# Patient Record
Sex: MALE | Race: BLACK OR AFRICAN AMERICAN | NOT HISPANIC OR LATINO | Employment: UNEMPLOYED | URBAN - METROPOLITAN AREA
[De-identification: names, ages, dates, MRNs, and addresses within clinical notes are randomized per-mention and may not be internally consistent; named-entity substitution may affect disease eponyms.]

---

## 2024-02-19 ENCOUNTER — APPOINTMENT (OUTPATIENT)
Dept: RADIOLOGY | Facility: HOSPITAL | Age: 74
End: 2024-02-19
Payer: COMMERCIAL

## 2024-02-19 ENCOUNTER — HOSPITAL ENCOUNTER (OUTPATIENT)
Facility: HOSPITAL | Age: 74
Setting detail: OBSERVATION
Discharge: HOME | End: 2024-02-19
Attending: EMERGENCY MEDICINE | Admitting: PHYSICIAN ASSISTANT
Payer: COMMERCIAL

## 2024-02-19 ENCOUNTER — CLINICAL SUPPORT (OUTPATIENT)
Dept: EMERGENCY MEDICINE | Facility: HOSPITAL | Age: 74
End: 2024-02-19
Payer: COMMERCIAL

## 2024-02-19 VITALS
DIASTOLIC BLOOD PRESSURE: 54 MMHG | WEIGHT: 268 LBS | TEMPERATURE: 98.1 F | RESPIRATION RATE: 20 BRPM | SYSTOLIC BLOOD PRESSURE: 132 MMHG | BODY MASS INDEX: 39.69 KG/M2 | HEIGHT: 69 IN | OXYGEN SATURATION: 98 % | HEART RATE: 61 BPM

## 2024-02-19 DIAGNOSIS — J44.1 COPD EXACERBATION (MULTI): Primary | ICD-10-CM

## 2024-02-19 PROBLEM — J44.9 COPD (CHRONIC OBSTRUCTIVE PULMONARY DISEASE) (MULTI): Status: ACTIVE | Noted: 2024-02-19

## 2024-02-19 LAB
ALBUMIN SERPL BCP-MCNC: 4.1 G/DL (ref 3.4–5)
ALP SERPL-CCNC: 114 U/L (ref 33–136)
ALT SERPL W P-5'-P-CCNC: 23 U/L (ref 10–52)
ANION GAP BLDV CALCULATED.4IONS-SCNC: 9 MMOL/L (ref 10–25)
ANION GAP SERPL CALC-SCNC: 13 MMOL/L (ref 10–20)
AST SERPL W P-5'-P-CCNC: 26 U/L (ref 9–39)
BASE EXCESS BLDV CALC-SCNC: 7 MMOL/L (ref -2–3)
BASOPHILS # BLD AUTO: 0.04 X10*3/UL (ref 0–0.1)
BASOPHILS NFR BLD AUTO: 0.5 %
BILIRUB SERPL-MCNC: 0.3 MG/DL (ref 0–1.2)
BNP SERPL-MCNC: 192 PG/ML (ref 0–99)
BODY TEMPERATURE: 37 DEGREES CELSIUS
BUN SERPL-MCNC: 42 MG/DL (ref 6–23)
CA-I BLDV-SCNC: 1.18 MMOL/L (ref 1.1–1.33)
CALCIUM SERPL-MCNC: 9.5 MG/DL (ref 8.6–10.6)
CARDIAC TROPONIN I PNL SERPL HS: 13 NG/L (ref 0–53)
CARDIAC TROPONIN I PNL SERPL HS: 9 NG/L (ref 0–53)
CHLORIDE BLDV-SCNC: 102 MMOL/L (ref 98–107)
CHLORIDE SERPL-SCNC: 101 MMOL/L (ref 98–107)
CO2 SERPL-SCNC: 32 MMOL/L (ref 21–32)
CREAT SERPL-MCNC: 2.38 MG/DL (ref 0.5–1.3)
EGFRCR SERPLBLD CKD-EPI 2021: 28 ML/MIN/1.73M*2
EOSINOPHIL # BLD AUTO: 0.12 X10*3/UL (ref 0–0.4)
EOSINOPHIL NFR BLD AUTO: 1.6 %
ERYTHROCYTE [DISTWIDTH] IN BLOOD BY AUTOMATED COUNT: 18.4 % (ref 11.5–14.5)
FLUAV RNA RESP QL NAA+PROBE: NOT DETECTED
FLUBV RNA RESP QL NAA+PROBE: NOT DETECTED
GLUCOSE BLDV-MCNC: 161 MG/DL (ref 74–99)
GLUCOSE SERPL-MCNC: 158 MG/DL (ref 74–99)
HCO3 BLDV-SCNC: 34.7 MMOL/L (ref 22–26)
HCT VFR BLD AUTO: 31.1 % (ref 41–52)
HCT VFR BLD EST: 28 % (ref 41–52)
HGB BLD-MCNC: 9.5 G/DL (ref 13.5–17.5)
HGB BLDV-MCNC: 9.2 G/DL (ref 13.5–17.5)
IMM GRANULOCYTES # BLD AUTO: 0.03 X10*3/UL (ref 0–0.5)
IMM GRANULOCYTES NFR BLD AUTO: 0.4 % (ref 0–0.9)
LACTATE BLDV-SCNC: 1.5 MMOL/L (ref 0.4–2)
LYMPHOCYTES # BLD AUTO: 1.81 X10*3/UL (ref 0.8–3)
LYMPHOCYTES NFR BLD AUTO: 24.4 %
MAGNESIUM SERPL-MCNC: 2.81 MG/DL (ref 1.6–2.4)
MCH RBC QN AUTO: 25.7 PG (ref 26–34)
MCHC RBC AUTO-ENTMCNC: 30.5 G/DL (ref 32–36)
MCV RBC AUTO: 84 FL (ref 80–100)
MONOCYTES # BLD AUTO: 0.64 X10*3/UL (ref 0.05–0.8)
MONOCYTES NFR BLD AUTO: 8.6 %
NEUTROPHILS # BLD AUTO: 4.78 X10*3/UL (ref 1.6–5.5)
NEUTROPHILS NFR BLD AUTO: 64.5 %
NRBC BLD-RTO: 0 /100 WBCS (ref 0–0)
OXYHGB MFR BLDV: 46.9 % (ref 45–75)
PCO2 BLDV: 69 MM HG (ref 41–51)
PH BLDV: 7.31 PH (ref 7.33–7.43)
PLATELET # BLD AUTO: 228 X10*3/UL (ref 150–450)
PO2 BLDV: 37 MM HG (ref 35–45)
POTASSIUM BLDV-SCNC: 4.4 MMOL/L (ref 3.5–5.3)
POTASSIUM SERPL-SCNC: 4.4 MMOL/L (ref 3.5–5.3)
PROT SERPL-MCNC: 7.5 G/DL (ref 6.4–8.2)
RBC # BLD AUTO: 3.7 X10*6/UL (ref 4.5–5.9)
SAO2 % BLDV: 48 % (ref 45–75)
SARS-COV-2 RNA RESP QL NAA+PROBE: NOT DETECTED
SODIUM BLDV-SCNC: 141 MMOL/L (ref 136–145)
SODIUM SERPL-SCNC: 142 MMOL/L (ref 136–145)
WBC # BLD AUTO: 7.4 X10*3/UL (ref 4.4–11.3)

## 2024-02-19 PROCEDURE — 80053 COMPREHEN METABOLIC PANEL: CPT

## 2024-02-19 PROCEDURE — 93005 ELECTROCARDIOGRAM TRACING: CPT

## 2024-02-19 PROCEDURE — 84132 ASSAY OF SERUM POTASSIUM: CPT

## 2024-02-19 PROCEDURE — 71046 X-RAY EXAM CHEST 2 VIEWS: CPT

## 2024-02-19 PROCEDURE — 99285 EMERGENCY DEPT VISIT HI MDM: CPT | Mod: 25

## 2024-02-19 PROCEDURE — 87636 SARSCOV2 & INF A&B AMP PRB: CPT

## 2024-02-19 PROCEDURE — 94640 AIRWAY INHALATION TREATMENT: CPT

## 2024-02-19 PROCEDURE — 84484 ASSAY OF TROPONIN QUANT: CPT

## 2024-02-19 PROCEDURE — G0378 HOSPITAL OBSERVATION PER HR: HCPCS

## 2024-02-19 PROCEDURE — 71046 X-RAY EXAM CHEST 2 VIEWS: CPT | Performed by: RADIOLOGY

## 2024-02-19 PROCEDURE — 96365 THER/PROPH/DIAG IV INF INIT: CPT | Performed by: PHYSICIAN ASSISTANT

## 2024-02-19 PROCEDURE — 83880 ASSAY OF NATRIURETIC PEPTIDE: CPT

## 2024-02-19 PROCEDURE — 83735 ASSAY OF MAGNESIUM: CPT

## 2024-02-19 PROCEDURE — 2500000002 HC RX 250 W HCPCS SELF ADMINISTERED DRUGS (ALT 637 FOR MEDICARE OP, ALT 636 FOR OP/ED): Mod: SE | Performed by: PHYSICIAN ASSISTANT

## 2024-02-19 PROCEDURE — 85025 COMPLETE CBC W/AUTO DIFF WBC: CPT

## 2024-02-19 PROCEDURE — 2500000002 HC RX 250 W HCPCS SELF ADMINISTERED DRUGS (ALT 637 FOR MEDICARE OP, ALT 636 FOR OP/ED): Mod: SE

## 2024-02-19 PROCEDURE — 99236 HOSP IP/OBS SAME DATE HI 85: CPT | Performed by: PHYSICIAN ASSISTANT

## 2024-02-19 PROCEDURE — 2500000004 HC RX 250 GENERAL PHARMACY W/ HCPCS (ALT 636 FOR OP/ED): Mod: SE

## 2024-02-19 PROCEDURE — 93010 ELECTROCARDIOGRAM REPORT: CPT | Performed by: EMERGENCY MEDICINE

## 2024-02-19 PROCEDURE — 36415 COLL VENOUS BLD VENIPUNCTURE: CPT

## 2024-02-19 RX ORDER — PREDNISONE 20 MG/1
40 TABLET ORAL ONCE
Status: COMPLETED | OUTPATIENT
Start: 2024-02-19 | End: 2024-02-19

## 2024-02-19 RX ORDER — IPRATROPIUM BROMIDE AND ALBUTEROL SULFATE 2.5; .5 MG/3ML; MG/3ML
3 SOLUTION RESPIRATORY (INHALATION) EVERY 20 MIN
Status: COMPLETED | OUTPATIENT
Start: 2024-02-19 | End: 2024-02-19

## 2024-02-19 RX ORDER — ALBUTEROL SULFATE 0.83 MG/ML
2.5 SOLUTION RESPIRATORY (INHALATION) EVERY 2 HOUR PRN
Status: DISCONTINUED | OUTPATIENT
Start: 2024-02-19 | End: 2024-02-19 | Stop reason: HOSPADM

## 2024-02-19 RX ORDER — AZITHROMYCIN 250 MG/1
500 TABLET, FILM COATED ORAL DAILY
Qty: 8 TABLET | Refills: 0 | Status: SHIPPED | OUTPATIENT
Start: 2024-02-19 | End: 2024-02-23

## 2024-02-19 RX ORDER — PREDNISONE 20 MG/1
40 TABLET ORAL DAILY
Qty: 6 TABLET | Refills: 0 | Status: SHIPPED | OUTPATIENT
Start: 2024-02-19 | End: 2024-02-22

## 2024-02-19 RX ORDER — ALBUTEROL SULFATE 90 UG/1
2 AEROSOL, METERED RESPIRATORY (INHALATION) EVERY 6 HOURS PRN
Qty: 18 G | Refills: 0 | Status: SHIPPED | OUTPATIENT
Start: 2024-02-19 | End: 2025-02-18

## 2024-02-19 RX ORDER — BUDESONIDE 0.5 MG/2ML
0.5 INHALANT ORAL
Status: DISCONTINUED | OUTPATIENT
Start: 2024-02-19 | End: 2024-02-19 | Stop reason: HOSPADM

## 2024-02-19 RX ORDER — IPRATROPIUM BROMIDE AND ALBUTEROL SULFATE 2.5; .5 MG/3ML; MG/3ML
3 SOLUTION RESPIRATORY (INHALATION)
Status: DISCONTINUED | OUTPATIENT
Start: 2024-02-19 | End: 2024-02-19 | Stop reason: HOSPADM

## 2024-02-19 RX ADMIN — PREDNISONE 40 MG: 20 TABLET ORAL at 09:49

## 2024-02-19 RX ADMIN — AZITHROMYCIN 500 MG: 500 INJECTION, POWDER, LYOPHILIZED, FOR SOLUTION INTRAVENOUS at 12:14

## 2024-02-19 RX ADMIN — IPRATROPIUM BROMIDE AND ALBUTEROL SULFATE 3 ML: .5; 3 SOLUTION RESPIRATORY (INHALATION) at 09:49

## 2024-02-19 RX ADMIN — IPRATROPIUM BROMIDE AND ALBUTEROL SULFATE 3 ML: .5; 3 SOLUTION RESPIRATORY (INHALATION) at 09:50

## 2024-02-19 RX ADMIN — ALBUTEROL SULFATE 2.5 MG: 2.5 SOLUTION RESPIRATORY (INHALATION) at 15:54

## 2024-02-19 RX ADMIN — BUDESONIDE 0.5 MG: 0.5 INHALANT RESPIRATORY (INHALATION) at 19:26

## 2024-02-19 RX ADMIN — IPRATROPIUM BROMIDE AND ALBUTEROL SULFATE 3 ML: .5; 3 SOLUTION RESPIRATORY (INHALATION) at 15:47

## 2024-02-19 RX ADMIN — IPRATROPIUM BROMIDE AND ALBUTEROL SULFATE 3 ML: .5; 3 SOLUTION RESPIRATORY (INHALATION) at 19:25

## 2024-02-19 RX ADMIN — IPRATROPIUM BROMIDE AND ALBUTEROL SULFATE 3 ML: .5; 3 SOLUTION RESPIRATORY (INHALATION) at 09:48

## 2024-02-19 ASSESSMENT — COLUMBIA-SUICIDE SEVERITY RATING SCALE - C-SSRS
6. HAVE YOU EVER DONE ANYTHING, STARTED TO DO ANYTHING, OR PREPARED TO DO ANYTHING TO END YOUR LIFE?: NO
2. HAVE YOU ACTUALLY HAD ANY THOUGHTS OF KILLING YOURSELF?: NO
1. IN THE PAST MONTH, HAVE YOU WISHED YOU WERE DEAD OR WISHED YOU COULD GO TO SLEEP AND NOT WAKE UP?: NO

## 2024-02-19 ASSESSMENT — PAIN SCALES - GENERAL
PAINLEVEL_OUTOF10: 0 - NO PAIN
PAINLEVEL_OUTOF10: 0 - NO PAIN

## 2024-02-19 ASSESSMENT — PAIN - FUNCTIONAL ASSESSMENT
PAIN_FUNCTIONAL_ASSESSMENT: 0-10
PAIN_FUNCTIONAL_ASSESSMENT: 0-10

## 2024-02-19 NOTE — ED PROVIDER NOTES
CC: Shortness of Breath     HPI:  Patient is a 73-year-old male with a past medical history of COPD on 3L NC, HTN, HFpEF (EF 55% 11/23), A-fib/flutter on AC, infective endocarditis s/p AVR and MVR, and CKD 3B who presents to the ED for shortness of breath.  Patient noted shortness of breath that began approximately 2 hours before ED presentation.  He noted taking his home albuterol without any any improvement of the symptoms.  Denied any trigger as well as improving or worsening factors for his shortness of breath.  He notes that his shortness of breath is typical for his COPD exacerbations.  Patient presents to the ED on 5 L nasal cannula.  Patient is on 3 L nasal cannula at baseline.  Denied fevers, chills, nausea, vomiting, chest pain, abdominal pain, difficulty breathing, orthopnea, paroxysmal nocturnal dyspnea, headache, trauma, falls, dysuria, abnormal bowel moods.    Limitations to history: None  Independent historian(s): None  Records Reviewed: Recent available ED and inpatient notes reviewed in EMR.    PMHx/PSHx:  Per HPI.   - has no past medical history on file.  - has a past surgical history that includes Cardiac surgery (04/09/2018).    Medications:  Reviewed in EMR. See EMR for complete list of medications and doses.    Allergies:  Patient has no allergy information on record.    Social History:  - Tobacco:  has no history on file for tobacco use.   - Alcohol:  has no history on file for alcohol use.   - Illicit Drugs:  has no history on file for drug use.     ROS:  Per HPI.       ???????????????????????????????????????????????????????????????  Triage Vitals:  T 36.8 °C (98.2 °F)  HR 73  /60  RR (!) 24  O2 98 %      Physical Exam    General: Mild respiratory distress.  Head: Normocephalic. Atraumatic.    Neck: FROM. No gross masses.   Eyes: EOMI. No scleral icterus or injection.   ENT: Moist mucous membranes, no apparent trauma or lesions.  CV: Regular rhythm. No murmurs, rubs, gallops  appreciated. 2+ radial pulses bilaterally.  Resp: Diminished breath sound bilaterally.  Tachypnea.  GI: Soft, non-distended. No tenderness with palpation. No rebound tenderness or guarding. No palpable masses.  : No suprapubic or CVA tenderness.  MSK: No gross step offs or deformities.  EXT: No peripheral edema, contusions, or wounds.  Skin: Warm and dry, no rashes or lesions.  Neuro: No focal neurological deficits from stated baseline. Speech fluent.  Psych: Appropriate mood and behavior, converses and responds appropriately.    ???????????????????????????????????????????????????????????????  Labs:   Labs Reviewed   BLOOD GAS VENOUS FULL PANEL   TROPONIN SERIES- (INITIAL, 1 HR)    Narrative:     The following orders were created for panel order Troponin I Series, High Sensitivity (0, 1 HR).  Procedure                               Abnormality         Status                     ---------                               -----------         ------                     Troponin I, High Sensiti...[[id number]]                                                   Please view results for these tests on the individual orders.   CBC WITH AUTO DIFFERENTIAL   COMPREHENSIVE METABOLIC PANEL   MAGNESIUM   B-TYPE NATRIURETIC PEPTIDE   SARS-COV-2 AND INFLUENZA A/B PCR   SERIAL TROPONIN-INITIAL        Imaging:   No orders to display        EKG:  Rate is 110, afib, normal axis, no interval prolongation, no st elevation or depression.  No previous EKG.  Review of EKG does not show any signs of STEMI, complete heart block, asystole, V-fib.    MDM:  Patient is a 73-year-old male with a past medical history of COPD on 3L NC, HTN, HFpEF (EF 55% 11/23), A-fib/flutter on AC, infective endocarditis s/p AVR and MVR, and CKD 3B who presents to the ED for shortness of breath. Patient presented tachypneic with a respiratory rate of 24.  Patient satting mid 90s on 5 L nasal cannula.  Physical exam findings significant for diminished breath sounds  bilaterally and mild dyspnea.  Low clinical concern for PE or pneumothorax.  Low clinical concern for ACS unremarkable EKG and troponins.  He has full panel significant for hypercapnia with pCO2 of 69.  Previous pCO2 of 50.3 at [facility] on 6/15/2023.   Patient ordered DuoNebs and prednisone for COPD exacerbation.  CBC displayed leukocytosis and stable anemia of 9.5 with previous hemoglobin 9.7 on 2/1/2024 at OSH.  Electrolytes are within normal limits.  Creatinine noted to be elevated at 2.38 with previous creatinine of 2.46 on 2/1/2024 at OSH.  Low clinical concern for new onset heart failure with unremarkable BNP of 192 and no findings concerning for fluid overload.  CXR did not show any acute cardiopulmonary process including findings Durning for pneumonia.  Upon reevaluation, patient notes mild improvement of his work of breathing.  Patient's oxygen was down titrated to 2 L nasal cannula.  Discussed ED findings and CDU admission with the patient.  Patient stated understanding agreement with the plan.  All questions were answered.  Discussed patient presentation with CDU provider.  Patient mid to the CDU in stable condition.    ED Course:  Diagnoses as of 02/20/24 0950   COPD exacerbation (CMS/[facility])       Social Determinants Limiting Care:      Disposition:  Admit to CDU    Jerry Bergeron MD   Emergency Medicine PGY-2  [facility]    Comment: Please note this report has been produced using speech recognition software and may contain errors related to that system including errors in grammar, punctuation, and spelling as well as words and phrases that may be inappropriate.  If there are any questions or concerns please feel free to contact the dictating provider for clarification.    Procedures ? NEMOPTIC last updated 2/19/2024 8:51 AM     Attending Note:  The patient was seen by the resident/fellow/IVAN.  I have personally performed a substantive portion of the encounter.  I  have seen and examined the patient; agree with the workup, evaluation, MDM, management and diagnosis with the exception/addition of the following.  The care plan has been discussed with the resident/fellow; I have reviewed the resident/fellow’s note and agree with the documented findings with the exception/addition of the following:       HPI:   Dimitri Shields is a 73 year old male with history of bipolar disorder, HTN, HLD, IDDM (HbA1c ), AF (s/p multiple DCCV, on Eliquis), endocarditis (s/p MVR [bioprosthetic], AVR [homograft]), HFrecEF (LVEF 59% 8/2023), restrictive lung disease (FVC 68, FEV1 68, FEV1/FVC 98%, DLCO 55 -1/23), chronic respiratory failure (2-3 L O2 baseline), CKD 3B, chronic hepatitis C, CAD, and polysubstance abuse (heroin, alcohol, cocaine, marijuana, currently on Suboxone) presenting to the ED for concern for shortness of breath. The patient reports that this AM around 0500, he woke with shortness of breath and slight wheezing.  He denies any chest pain, palpitations, or hemoptysis. He denies prior history of DVT/PE. He denies calf swelling/calf pain.  No cough, rhinorrhea, fevers, chills or night sweats.  He reports he used his inhaler a few times without resolution.  He reports baseline 2-3L but required 5L this AM en-route to ED with EMS.     Per chart review, had nuclear stress 2/2023.   CONCLUSIONS:    1. SPECT Perfusion Study: Abnormal.very poor study due to body habitus   and motion.    2. There is no scintigraphic evidence for inducible ischemia.    3. There is a small (<10%) fixed perfusion defect in the RCA territory.    4. Left ventricle is normal in size. The left ventricle systolic   function is normal.    5. Right ventricle is mildly dilated. The right ventricle systolic   function is mildly decreased.    6. This is a low risk scan.     Additional History Obtained from: See HPI       Physical Exam:  General: Grossly well-developed, awake, alert, NAD    Head: Normocephalic, no overt  external signs of trauma    ENT: Mucus membranes moist, nares patent, uvula midline, no exudates, tonsillar pillars grossly symmetric    Neck: Supple, trachea midline    Neurologic: Awake and alert, FS, TM, PATIÑO x 4 with grossly symmetric strength, SILT grossly intact BUE and BLE    Cardiovascular: Appears regular, pulses grossly symmetric    Respiratory: End expiratory wheeze B/L, no obvious rales/rhonchi    Abdomen: Soft, not appreciably tender, no evident rebound/guarding, no pulsatile masses palpable.     Back: No apparent CVA TTP, no midline TLS spine TTP, stepoffs, or acute deformities    MSK: No gross bony deformities in BUE and BLE, no significant edema, no calf pain or palpable cords    Skin/Integumentary: Warm and dry    Psychiatric: Calm and cooperative.        EKG Interpretation:  EKG 2/19/23 - 0917 - limited 2/2 background artifact.  Appears regular, rate 110, axis normal, intervals QRS 94, Qtc 316, significant background artifact limiting interpretation, however, no obvious significant ST elevation or depression. No prior EKG for comparison in EMR at this time.     Interpreted by provider as above    Differential Diagnoses Considered:  See MDM below    Chronic Medical Conditions Significantly Affecting Care:  See MDM below    External Records Reviewed:  I reviewed recent and relevant outside records as noted in HPI above and MDM below.     Independent Interpretation of Studies:    Laboratory/Imaging Studies:  Laboratory results personally reviewed and independently interpreted:  CBC without significant thrombocytopenia or leukocytosis. Hb 9.5, stable from prior (9.7 2/1/24 at OSH)    Metabolic panel with creatinine 2.38 (recent prior 2.46 2/1/24 at OSH)  No hypomagnesemia  HS troponin not elevated.  Delta troponin stable    7.31/69/37, lactate 1.5  COVID-19/influenza negative    Radiology imaging and preliminary and/or final reports personally reviewed/independently  interpreted:  CXR  IMPRESSION:  Limited study due to hypoinflated lungs and patient positioning.      Diffuse prominence of the interstitial lung markings in the mid and  lower lung zones which may represent pulmonary edema or atypical  pneumonia. Correlate clinically.    Social Determinants of Health Significantly Affecting Care:  See HPI/MDM    Prescription Drug Consideration:  See MDM    Diagnostic testing considered:  See MDM and relevant sections      Medical Decision Making/Course:  73 year old male with history of bipolar disorder, HTN, HLD, IDDM (HbA1c ), AF (s/p multiple DCCV, on Eliquis), endocarditis (s/p MVR [bioprosthetic], AVR [homograft]), HFrecEF (LVEF 59% 8/2023), restrictive lung disease (FVC 68, FEV1 68, FEV1/FVC 98%, DLCO 55 -1/23), chronic respiratory failure (2-3 L O2 baseline), CKD 3B, chronic hepatitis C, CAD, and polysubstance abuse (heroin, alcohol, cocaine, marijuana, currently on Suboxone) presenting to the ED for concern for shortness of breath. The patient was afebrile, non-toxic and HDS on arrival, saturating well on NC. He had no increased WOB. He had an end expiratory wheeze and was given steroids and albuterol with improvement of wheezing, and was weaned to home supplemental oxygen. CXR with questionable atypical PNA vs pulmonary edema, BNP intermediate, however, patient without significant LE edema and denies orthopnea, PND, or other history consistent with CHF exacerbation.  He denies any chest pain. There was no calf swelling/pain, recent long travel/immobilization, malignancy, or major risk factors or history concerning for DVT/PE.  There were no obvious EKG changes concerning for pericarditis.  History and exam less consistent with myocarditis.  EKG and troponin, as well as history less consistent with ACS.  CXR without signs of pneumothorax, large effusion, or other acute thoracic process.  Pulses were grossly symmetric and there was no tearing chest pain to the back, or  history/exam findings consistent with aortic dissection  No pusatile abdominal mass or signs of ruptured AAA.  Patient was in agreement with admission to CDU for further breathing treatments, antibiotics, consideration of diuresis if later found to be indicated, and further optimization. An opportunity to ask questions was provided and all were addressed at that time.  The patient verbalized understanding and was in agreement with plan.          Jerry Bergeron MD  Resident  02/20/24 [id number]

## 2024-02-19 NOTE — ED PROVIDER NOTES
History and Physical  [facility] CLINICAL DECISION  Patient: Dimitri Shields  MRN: [id number]  : 1950  Date of Evaluation: 2024  ED Provider: Johnny Moscoso PA-C      Limitations to history: None  Independent Historian: Yes  External Records Reviewed: Recent and relevant inpatient and outpatient notes and EMR      Patient History:  Dimitri Shields is a 73 y.o. male with past medical history of hypertension, COPD (2-3 lpm), A-fib/flutter on AC, CKD stage III, polysubstance abuse, HFpEF (55% on 2023), chronic hepatitis C, CAD, infective endocarditis status post AVR and MVR who is admitted to the Clinical Decision Unit for COPD exacerbation.  Patient reports becoming short of breath at approximately 6 AM this morning.  Patient states the symptoms awoke him from sleep.  He endorsed using his rescue inhalers a few times without improvement.  He denies any chest pain, fever, chills, cough, nausea, vomiting, back pain, headache, dizziness, nasal congestion, rhinorrhea, abdominal pain, extremity pain/swelling or any recent illness.    The acute evaluation included:  Orders Placed This Encounter   Procedures    XR chest 2 views    BLOOD GAS VENOUS FULL PANEL    Troponin I Series, High Sensitivity (0, 1 HR)    CBC and Auto Differential    Comprehensive Metabolic Panel    Magnesium    B-type natriuretic peptide    Sars-CoV-2 and Influenza A/B PCR    Troponin I, High Sensitivity, Initial    Troponin, High Sensitivity, 1 Hour    Adult diet 2-3 grams sodium    Cardiac Monitoring - ED/ICU/PACU Only    ECG 12 lead    Insert and maintain peripheral IV    Send to CDU    Initiate observation status       I reviewed the below labs and imaging as ordered by the ED provider:  XR chest 2 views   Final Result   Limited study due to hypoinflated lungs and patient positioning.        Diffuse prominence of the interstitial lung markings in the mid and   lower lung zones which may represent pulmonary edema or  atypical   pneumonia. Correlate clinically.        MACRO:   None.        Signed by: Nohemi Richardson 2/19/2024 10:01 AM   Dictation workstation:   [id number]          Labs Reviewed   CBC WITH AUTO DIFFERENTIAL - Abnormal       Result Value    WBC 7.4      nRBC 0.0      RBC 3.70 (*)     Hemoglobin 9.5 (*)     Hematocrit 31.1 (*)     MCV 84      MCH 25.7 (*)     MCHC 30.5 (*)     RDW 18.4 (*)     Platelets 228      Neutrophils % 64.5      Immature Granulocytes %, Automated 0.4      Lymphocytes % 24.4      Monocytes % 8.6      Eosinophils % 1.6      Basophils % 0.5      Neutrophils Absolute 4.78      Immature Granulocytes Absolute, Automated 0.03      Lymphocytes Absolute 1.81      Monocytes Absolute 0.64      Eosinophils Absolute 0.12      Basophils Absolute 0.04     COMPREHENSIVE METABOLIC PANEL - Abnormal    Glucose 158 (*)     Sodium 142      Potassium 4.4      Chloride 101      Bicarbonate 32      Anion Gap 13      Urea Nitrogen 42 (*)     Creatinine 2.38 (*)     eGFR 28 (*)     Calcium 9.5      Albumin 4.1      Alkaline Phosphatase 114      Total Protein 7.5      AST 26      Bilirubin, Total 0.3      ALT 23     MAGNESIUM - Abnormal    Magnesium 2.81 (*)    B-TYPE NATRIURETIC PEPTIDE - Abnormal     (*)     Narrative:        <100 pg/mL - Heart failure unlikely  100-299 pg/mL - Intermediate probability of acute heart                  failure exacerbation. Correlate with clinical                  context and patient history.    >=300 pg/mL - Heart Failure likely. Correlate with clinical                  context and patient history.     Biotin interference may cause falsely decreased results. Patients taking a Biotin dose of up to 5 mg/day should refrain from taking Biotin for 24 hours before sample  collection. Providers may contact their local laboratory for further information.   BLOOD GAS VENOUS FULL PANEL UNSOLICITED - Abnormal    POCT pH, Venous 7.31 (*)     POCT pCO2, Venous 69 (*)     POCT pO2, Venous  37      POCT SO2, Venous 48      POCT Oxy Hemoglobin, Venous 46.9      POCT Hematocrit Calculated, Venous 28.0 (*)     POCT Sodium, Venous 141      POCT Potassium, Venous 4.4      POCT Chloride, Venous 102      POCT Ionized Calicum, Venous 1.18      POCT Glucose, Venous 161 (*)     POCT Lactate, Venous 1.5      POCT Base Excess, Venous 7.0 (*)     POCT HCO3 Calculated, Venous 34.7 (*)     POCT Hemoglobin, Venous 9.2 (*)     POCT Anion Gap, Venous 9.0 (*)     Patient Temperature 37.0     SARS-COV-2 AND INFLUENZA A/B PCR - Normal    Flu A Result Not Detected      Flu B Result Not Detected      Coronavirus 2019, PCR Not Detected      Narrative:     This assay has received FDA Emergency Use Authorization (EUA) and  is only authorized for the duration of time that circumstances exist to justify the authorization of the emergency use of in vitro diagnostic tests for the detection of SARS-CoV-2 virus and/or diagnosis of COVID-19 infection under section 564(b)(1) of the Act, 21 U.S.C. 360bbb-3(b)(1). Testing for SARS-CoV-2 is only recommended for patients who meet current clinical and/or epidemiological criteria as defined by federal, state, or local public health directives. This assay is an in vitro diagnostic nucleic acid amplification test for the qualitative detection of SARS-CoV-2, Influenza A, and Influenza B from nasopharyngeal specimens and has been validated for use at [facility]. Negative results do not preclude COVID-19 infections or Influenza A/B infections, and should not be used as the sole basis for diagnosis, treatment, or other management decisions. If Influenza A/B and RSV PCR results are negative, testing for Parainfluenza virus, Adenovirus and Metapneumovirus is routinely performed for [facility] pediatric oncology and intensive care inpatients, and is available on other patients by placing an add-on request.    SERIAL TROPONIN-INITIAL - Normal    Troponin I, High Sensitivity 13       Narrative:     Less than 99th percentile of normal range cutoff-  Female and children under 18 years old <35 ng/L; Male <54 ng/L: Negative  Repeat testing should be performed if clinically indicated.     Female and children under 18 years old  ng/L; Male  ng/L:  Consistent with possible cardiac damage and possible increased clinical   risk. Serial measurements may help to assess extent of myocardial damage.     >120 ng/L: Consistent with cardiac damage, increased clinical risk and  myocardial infarction. Serial measurements may help assess extent of   myocardial damage.      NOTE: Children less than 1 year old may have higher baseline troponin   levels and results should be interpreted in conjunction with the overall   clinical context.    NOTE: Troponin I testing is performed using a different   testing methodology at [facility] than at other   [facility]. Direct result comparisons should only   be made within the same method.     SERIAL TROPONIN, 1 HOUR - Normal    Troponin I, High Sensitivity 9      Narrative:     Less than 99th percentile of normal range cutoff-  Female and children under 18 years old <35 ng/L; Male <54 ng/L: Negative  Repeat testing should be performed if clinically indicated.     Female and children under 18 years old  ng/L; Male  ng/L:  Consistent with possible cardiac damage and possible increased clinical   risk. Serial measurements may help to assess extent of myocardial damage.     >120 ng/L: Consistent with cardiac damage, increased clinical risk and  myocardial infarction. Serial measurements may help assess extent of   myocardial damage.      NOTE: Children less than 1 year old may have higher baseline troponin   levels and results should be interpreted in conjunction with the overall   clinical context.    NOTE: Troponin I testing is performed using a different   testing methodology at [facility] than at other   [facility]  hospitals. Direct result comparisons should only   be made within the same method.     TROPONIN SERIES- (INITIAL, 1 HR)    Narrative:     The following orders were created for panel order Troponin I Series, High Sensitivity (0, 1 HR).  Procedure                               Abnormality         Status                     ---------                               -----------         ------                     Troponin I, High Sensiti...[[id number]]  Normal              Final result               Troponin, High Sensitivi...[[id number]]  Normal              Final result                 Please view results for these tests on the individual orders.   BLOOD GAS VENOUS FULL PANEL           After discussion with the ED provider, a decision was made to admit the patient to the Clinical Decision Unit.    Upon admission to the Clinical Decision Unit, Mr. Shields is alert and oriented x 4 and appears in no acute distress.  Medical workup initiated in the emergency department today included EKG, laboratory studies and imaging.  Diagnostic information was obtained, reviewed and discussed with the patient.  EKG without acute ischemic changes or arrhythmia.  Notable findings of an elevated , high-sensitivity serum troponin of 9, hemoglobin 9.5 and hematocrit 31.1.  No significant leukocytosis, electrolyte derangement or anemia.  COVID and influenza PCR were negative.  Chest x-ray concerning for pulmonary edema versus atypical infection.  Treatment included azithromycin, DuoNeb breathing treatments and prednisone.  Mr. Shields appears to be resting comfortably and vital signs are stable.  Patient was placed into the CDU for close observation, telemetry and additional symptom management.    Past History   No past medical history on file.  Past Surgical History:   Procedure Laterality Date    CARDIAC SURGERY  04/09/2018    Heart Surgery     Social History     Socioeconomic History    Marital status: Single     Spouse name: Not on  "file    Number of children: Not on file    Years of education: Not on file    Highest education level: Not on file   Occupational History    Not on file   Tobacco Use    Smoking status: Not on file    Smokeless tobacco: Not on file   Substance and Sexual Activity    Alcohol use: Not on file    Drug use: Not on file    Sexual activity: Not on file   Other Topics Concern    Not on file   Social History Narrative    Not on file     Social Determinants of Health     Financial Resource Strain: Not on file   Food Insecurity: Not on file   Transportation Needs: Not on file   Physical Activity: Not on file   Stress: Not on file   Social Connections: Not on file   Intimate Partner Violence: Not on file   Housing Stability: Not on file         Medications/Allergies     Previous Medications    No medications on file     No Known Allergies      Review of Systems  All systems reviewed and otherwise negative, except as stated above in HPI.      Physical Exam     Visit Vitals  /71   Pulse 87   Temp 36.7 °C (98.1 °F)   Resp 18   Ht 1.753 m (5' 9\")   Wt 122 kg (268 lb)   SpO2 99%   BMI 39.58 kg/m²   BSA 2.44 m²         Physical exam    VS: As documented in the triage note and EMR flowsheet from this visit were reviewed.    General: Patient is AAOx3, appears well developed, well nourished, is a good historian, answers questions appropriately    HEENT: head normocephalic, atraumatic, PERRLA, EOMs intact, oropharynx without erythema or exudate, buccal mucosa intact without lesions, nose is patent bilateral    Neck: supple, full ROM, negative for lymphadenopathy, JVD, thyromegaly, tracheal deviation, nuchal rigidity    Pulmonary: Regular respiratory effort and pattern.  Mild diffuse expiratory wheezing.  No rales or rhonchi, no accessory muscle use, able to speak full clear sentences    Cardiac: Normal rate and rhythm, no murmurs, rubs or gallops    GI: soft, non-tender, non-distended, normoactive bowelsounds in all four " quadrants, no masses or organomegaly, no guarding or CVA tenderness noted    Musculoskeletal: full weight bearing, PATIÑO, no joint effusions, clubbing or edema noted    Skin: Warm, dry, intact, no lesions or rashes noted, turgor is good.    Neuro: patient follow commands, cranial nerves 2-12 grossly intact, motor strengths 5/5 upper and lower extremities, sensation are symmetrical. No focal deficits.    Psych: Appropriate mood and affect for situation      Consultants  1) N/A      Impression and Plan  In summary, Dimitri Shields is admitted to the [facility] Clinical Decision Unit for COPD. Dr. Barriga is the CDU admission attending.    This patient has been risk-stratified based on available history, physical exam, and related study findings. Admission to the observation status for further diagnosis/treatment/monitoring of COPD is warranted clinically. This extended period of observation is specifically required to determine the need for hospitalization.     The goals of this admission based on the patient’s clinical problem list are:  1) Stable vital signs  2) Symptom improvement    Assessment/ Plan  1) COPD Exacerbation  -Continuous telemetry  -Scheduled and as needed nebulized breathing treatment  -Prednisone 40 mg daily  -Azithromycin 500 mg daily  -Mucinex DM every 12 hours as needed  -Home meds    When met, appropriate disposition will be arranged.        Johnny Moscoso PA-C  02/19/24 [id number]

## 2024-02-19 NOTE — ED NOTES
Pt moved to CDU room 3 by myself via stretcher with all personal belongings      Uday Tapia RN  02/19/24 [id number]

## 2024-02-20 LAB
ATRIAL RATE: 79 BPM
Q ONSET: 211 MS
QRS COUNT: 20 BEATS
QRS DURATION: 96 MS
QT INTERVAL: 242 MS
QTC CALCULATION(BAZETT): 344 MS
QTC FREDERICIA: 306 MS
R AXIS: -33 DEGREES
T AXIS: -39 DEGREES
T OFFSET: 332 MS
VENTRICULAR RATE: 122 BPM

## 2024-02-20 NOTE — DISCHARGE SUMMARY
Disposition Note  [facility] CLINICAL DECISION  Patient: Dimtiri Shields  MRN: [id number]  : 1950  Date of Evaluation: 2024  ED Provider: MIKAYLA Henson-CNP          Subjective: 73-year-old male with history of COPD on 3L NC, HTN, HFpEF (EF 55% ), A-fib/flutter on AC, infective endocarditis s/p AVR and MVR, and CKD 3B who initially presented to the ED for shortness of breath.  Was admitted to the CDU for workup of COPD exacerbation.    Labs were relatively unremarkable, except for reduced renal function per patient's baseline, slightly elevated BNP at 192, and hypermagnesemia after receiving magnesium from EMS.  Mild respiratory acidosis with pH 7.31 and pCO2 69.  pO2 37.  Bicarb elevated at 34.7.  Lactate normal ranges.  Chest x-ray showed bilateral lower lung pulmonary edema versus atypical pneumonia.  On my evaluation the patient endorsed feeling improved since arrival.  Stated that he was not happy with how he was being treated here, despite receiving multiple therapies including steroids, magnesium, DuoNebs, and antibiotics (azithromycin).  Stated that he would like to leave at this point and go home.  Stated that he had adequate oxygen supplies, as well as nebulizer treatments and albuterol.  Advised to that even though he felt improved at this point, it would be advisable to stay overnight to continue treatments that are scheduled to ensure that he improves sufficiently.  Advised that he risks bounce back admission, severe injury, or even death.  Patient aware of this, but states that he still wants to go home.  Prescribed refill for albuterol inhaler, as well as prednisone and azithromycin.  Advised to return with any new or worsening symptoms and to follow-up with primary care soon as possible.  Patient did agree with this plan.  Discharged in stable condition.    Dimitri Shields is a 73 y.o. male has undergone comprehensive diagnostic evaluation and therapeutic management  in accordance with the CDU guidelines for COPD exacerbation. Based on clinical response and diagnostic information during this period of observation, it has been determined that the patient will be discharged.    The acute evaluation included:  Orders Placed This Encounter   Procedures    XR chest 2 views    BLOOD GAS VENOUS FULL PANEL    Troponin I Series, High Sensitivity (0, 1 HR)    CBC and Auto Differential    Comprehensive Metabolic Panel    Magnesium    B-type natriuretic peptide    Sars-CoV-2 and Influenza A/B PCR    Troponin I, High Sensitivity, Initial    Troponin, High Sensitivity, 1 Hour    Adult diet 2-3 grams sodium    Cardiac Monitoring - ED/ICU/PACU Only    Vital Signs    ECG 12 lead    Insert and maintain peripheral IV    Send to CDU    Initiate observation status         Placed in observation at:       Past History   No past medical history on file.  Past Surgical History:   Procedure Laterality Date    CARDIAC SURGERY  04/09/2018    Heart Surgery     Social History     Socioeconomic History    Marital status: Single     Spouse name: Not on file    Number of children: Not on file    Years of education: Not on file    Highest education level: Not on file   Occupational History    Not on file   Tobacco Use    Smoking status: Not on file    Smokeless tobacco: Not on file   Substance and Sexual Activity    Alcohol use: Not on file    Drug use: Not on file    Sexual activity: Not on file   Other Topics Concern    Not on file   Social History Narrative    Not on file     Social Determinants of Health     Financial Resource Strain: Not on file   Food Insecurity: Not on file   Transportation Needs: Not on file   Physical Activity: Not on file   Stress: Not on file   Social Connections: Not on file   Intimate Partner Violence: Not on file   Housing Stability: Not on file         Medications/Allergies     Discharge Medication List as of 2/19/2024  8:32 PM        No Known Allergies      Review of Systems  All  systems reviewed and otherwise negative, except as stated above in HPI.    Diagnostics reviewed by Bipin Lim, APRN-CNP     Labs:  Results for orders placed or performed during the hospital encounter of 02/19/24   CBC and Auto Differential   Result Value Ref Range    WBC 7.4 4.4 - 11.3 x10*3/uL    nRBC 0.0 0.0 - 0.0 /100 WBCs    RBC 3.70 (L) 4.50 - 5.90 x10*6/uL    Hemoglobin 9.5 (L) 13.5 - 17.5 g/dL    Hematocrit 31.1 (L) 41.0 - 52.0 %    MCV 84 80 - 100 fL    MCH 25.7 (L) 26.0 - 34.0 pg    MCHC 30.5 (L) 32.0 - 36.0 g/dL    RDW 18.4 (H) 11.5 - 14.5 %    Platelets 228 150 - 450 x10*3/uL    Neutrophils % 64.5 40.0 - 80.0 %    Immature Granulocytes %, Automated 0.4 0.0 - 0.9 %    Lymphocytes % 24.4 13.0 - 44.0 %    Monocytes % 8.6 2.0 - 10.0 %    Eosinophils % 1.6 0.0 - 6.0 %    Basophils % 0.5 0.0 - 2.0 %    Neutrophils Absolute 4.78 1.60 - 5.50 x10*3/uL    Immature Granulocytes Absolute, Automated 0.03 0.00 - 0.50 x10*3/uL    Lymphocytes Absolute 1.81 0.80 - 3.00 x10*3/uL    Monocytes Absolute 0.64 0.05 - 0.80 x10*3/uL    Eosinophils Absolute 0.12 0.00 - 0.40 x10*3/uL    Basophils Absolute 0.04 0.00 - 0.10 x10*3/uL   Comprehensive Metabolic Panel   Result Value Ref Range    Glucose 158 (H) 74 - 99 mg/dL    Sodium 142 136 - 145 mmol/L    Potassium 4.4 3.5 - 5.3 mmol/L    Chloride 101 98 - 107 mmol/L    Bicarbonate 32 21 - 32 mmol/L    Anion Gap 13 10 - 20 mmol/L    Urea Nitrogen 42 (H) 6 - 23 mg/dL    Creatinine 2.38 (H) 0.50 - 1.30 mg/dL    eGFR 28 (L) >60 mL/min/1.73m*2    Calcium 9.5 8.6 - 10.6 mg/dL    Albumin 4.1 3.4 - 5.0 g/dL    Alkaline Phosphatase 114 33 - 136 U/L    Total Protein 7.5 6.4 - 8.2 g/dL    AST 26 9 - 39 U/L    Bilirubin, Total 0.3 0.0 - 1.2 mg/dL    ALT 23 10 - 52 U/L   Magnesium   Result Value Ref Range    Magnesium 2.81 (H) 1.60 - 2.40 mg/dL   B-type natriuretic peptide   Result Value Ref Range     (H) 0 - 99 pg/mL   Sars-CoV-2 and Influenza A/B PCR   Result Value Ref Range    Flu  "A Result Not Detected Not Detected    Flu B Result Not Detected Not Detected    Coronavirus 2019, PCR Not Detected Not Detected   Troponin I, High Sensitivity, Initial   Result Value Ref Range    Troponin I, High Sensitivity 13 0 - 53 ng/L   Troponin, High Sensitivity, 1 Hour   Result Value Ref Range    Troponin I, High Sensitivity 9 0 - 53 ng/L   Blood Gas Venous Full Panel Unsolicited   Result Value Ref Range    POCT pH, Venous 7.31 (L) 7.33 - 7.43 pH    POCT pCO2, Venous 69 (H) 41 - 51 mm Hg    POCT pO2, Venous 37 35 - 45 mm Hg    POCT SO2, Venous 48 45 - 75 %    POCT Oxy Hemoglobin, Venous 46.9 45.0 - 75.0 %    POCT Hematocrit Calculated, Venous 28.0 (L) 41.0 - 52.0 %    POCT Sodium, Venous 141 136 - 145 mmol/L    POCT Potassium, Venous 4.4 3.5 - 5.3 mmol/L    POCT Chloride, Venous 102 98 - 107 mmol/L    POCT Ionized Calicum, Venous 1.18 1.10 - 1.33 mmol/L    POCT Glucose, Venous 161 (H) 74 - 99 mg/dL    POCT Lactate, Venous 1.5 0.4 - 2.0 mmol/L    POCT Base Excess, Venous 7.0 (H) -2.0 - 3.0 mmol/L    POCT HCO3 Calculated, Venous 34.7 (H) 22.0 - 26.0 mmol/L    POCT Hemoglobin, Venous 9.2 (L) 13.5 - 17.5 g/dL    POCT Anion Gap, Venous 9.0 (L) 10.0 - 25.0 mmol/L    Patient Temperature 37.0 degrees Celsius     Radiographs:  XR chest 2 views   Final Result   Limited study due to hypoinflated lungs and patient positioning.        Diffuse prominence of the interstitial lung markings in the mid and   lower lung zones which may represent pulmonary edema or atypical   pneumonia. Correlate clinically.        MACRO:   None.        Signed by: Nohemi Richardson 2/19/2024 10:01 AM   Dictation workstation:   [id number]              Physical Exam     Visit Vitals  /54   Pulse 61   Temp 36.7 °C (98.1 °F)   Resp 20   Ht 1.753 m (5' 9\")   Wt 122 kg (268 lb)   SpO2 98%   BMI 39.58 kg/m²   BSA 2.44 m²       GENERAL:  The patient appears nourished and normally developed. Vital signs as documented.     HEENT:  Head " normocephalic, atraumatic, EOMs intact, PERRLA, Mucous membranes moist. Nares patent without copious rhinorrhea.  No lymphadenopathy.    PULMONARY: No wheezes noted on my exam.  Slightly decreased breath sounds to the right lower lobe., without any respiratory distress. Able to speak full sentences, no accessory muscle use    CARDIAC:   Normal rate. No murmurs, rubs or gallops    ABDOMEN:  Soft, non-distended, non-tender, BS positive x 4 quadrants, No rebound or guarding, no peritoneal signs, no CVA tenderness, no masses or organomegaly    : External exam unremarkable, speculum exam with no discharge, no bleeding, no adnexal tenderness or masses    MUSCULOSKELETAL:   Able to ambulate, Non edematous, with no obvious deformities. Pulses intact distal    SKIN:   Good color, with no significant rashes.  No pallor.    NEURO:  No obvious neurological deficits, normal sensation and strength bilaterally.  Able to follow commands, NIH 0, CN 2-12 intact.    Psych: Appropriate mood and affect        Impression and Plan    In summary, Dimitri Shields has been cared for according to the standard [facility] Clinical Decision Unit observation protocol for COPD exacerbation (CMS/[facility]). This extended period of observation was specifically required to determine the need for hospitalization. Prior to discharge from observation, the final physical exam is documented above.     Significant events during the course of observation based on the goals of the clinical problem list include:   1) improved overall since arrival    Based on the patient's condition and test results, the patient will be discharged.    Total length of observation was less than 24 hours. Dr. Ross is the CDU disposition attending.      Discharge Diagnosis  COPD exacerbation (CMS/[facility])    Issues Requiring Follow-Up  Follow-up with primary care and pulmonary doctors regarding COPD exacerbation and reduced renal function    Discharge Meds      Your medication list        START taking these medications        Instructions Last Dose Given Next Dose Due   albuterol 90 mcg/actuation inhaler      Inhale 2 puffs every 6 hours if needed for wheezing.       azithromycin 250 mg tablet  Commonly known as: Zithromax      Take 2 tablets (500 mg) by mouth once daily for 4 doses.       predniSONE 20 mg tablet  Commonly known as: Deltasone      Take 2 tablets (40 mg) by mouth once daily for 3 days.                 Where to Get Your Medications        You can get these medications from any pharmacy    Bring a paper prescription for each of these medications  albuterol 90 mcg/actuation inhaler  azithromycin 250 mg tablet  predniSONE 20 mg tablet         Test Results Pending At Discharge  Pending Labs       Order Current Status    BLOOD GAS VENOUS FULL PANEL In process            Outpatient Follow-Up  No future appointments.      Bipin Lim, APRN-CNP